# Patient Record
Sex: FEMALE | Race: BLACK OR AFRICAN AMERICAN | NOT HISPANIC OR LATINO | URBAN - METROPOLITAN AREA
[De-identification: names, ages, dates, MRNs, and addresses within clinical notes are randomized per-mention and may not be internally consistent; named-entity substitution may affect disease eponyms.]

---

## 2017-01-16 ENCOUNTER — APPOINTMENT (RX ONLY)
Dept: URBAN - METROPOLITAN AREA OTHER 10 | Facility: OTHER | Age: 52
Setting detail: DERMATOLOGY
End: 2017-01-16

## 2017-01-16 ENCOUNTER — RX ONLY (OUTPATIENT)
Age: 52
Setting detail: RX ONLY
End: 2017-01-16

## 2017-01-16 DIAGNOSIS — Z41.9 ENCOUNTER FOR PROCEDURE FOR PURPOSES OTHER THAN REMEDYING HEALTH STATE, UNSPECIFIED: ICD-10-CM

## 2017-01-16 PROCEDURE — ? CHEMICAL PEEL

## 2017-01-16 RX ORDER — TRETINOIN 0.025 %
CREAM (GRAM) TOPICAL
Qty: 20 | Refills: 3 | COMMUNITY
Start: 2017-01-16

## 2017-01-16 ASSESSMENT — LOCATION SIMPLE DESCRIPTION DERM: LOCATION SIMPLE: LEFT CHEEK

## 2017-01-16 ASSESSMENT — LOCATION DETAILED DESCRIPTION DERM: LOCATION DETAILED: LEFT INFERIOR MEDIAL MALAR CHEEK

## 2017-01-16 ASSESSMENT — LOCATION ZONE DERM: LOCATION ZONE: FACE

## 2017-01-16 NOTE — PROCEDURE: CHEMICAL PEEL
Consent: Prior to the procedure, written consent was obtained and risks were reviewed, including but not limited to: redness, peeling, blistering, pigmentary change, scarring, infection, and pain.
Treatment Time (Optional): 15 minutes
Prep: The treated area was degreased with pre-peel cleanser, and vaseline was applied for protection of mucous membranes.
Post-Care Instructions: I reviewed with the patient in detail post-care instructions. Patient should avoid sun exposure and wear sun protection.
Treatment Number: 1
Detail Level: Zone
Chemical Peel: Jodie
Post Peel Care: After the procedure, a post-peel Retinol activator was applied to the treated areas. Sun protection and post-care instructions were reviewed with the patient.

## 2017-02-27 ENCOUNTER — APPOINTMENT (RX ONLY)
Dept: URBAN - METROPOLITAN AREA OTHER 10 | Facility: OTHER | Age: 52
Setting detail: DERMATOLOGY
End: 2017-02-27

## 2017-02-27 DIAGNOSIS — Z41.9 ENCOUNTER FOR PROCEDURE FOR PURPOSES OTHER THAN REMEDYING HEALTH STATE, UNSPECIFIED: ICD-10-CM | Status: UNCHANGED

## 2017-02-27 PROCEDURE — ? TREATMENT REGIMEN

## 2017-02-27 PROCEDURE — ? PRESCRIPTION

## 2017-02-27 RX ORDER — TRETINOIN 0.25 MG/G
CREAM TOPICAL QHS
Qty: 20 | Refills: 3 | COMMUNITY
Start: 2017-02-27

## 2017-02-27 RX ORDER — PHARMACY COMPOUNDING ACCESSORY
EACH MISCELLANEOUS
Qty: 1 | Refills: 3 | Status: ERX | COMMUNITY
Start: 2017-02-27

## 2017-02-27 RX ADMIN — TRETINOIN: 0.25 CREAM TOPICAL at 14:10

## 2017-02-27 RX ADMIN — Medication: at 14:10

## 2017-02-27 ASSESSMENT — LOCATION SIMPLE DESCRIPTION DERM
LOCATION SIMPLE: RIGHT CHEEK
LOCATION SIMPLE: LEFT CHEEK

## 2017-02-27 ASSESSMENT — LOCATION DETAILED DESCRIPTION DERM
LOCATION DETAILED: LEFT MEDIAL MALAR CHEEK
LOCATION DETAILED: RIGHT MEDIAL MALAR CHEEK

## 2017-02-27 ASSESSMENT — LOCATION ZONE DERM: LOCATION ZONE: FACE

## 2017-02-27 NOTE — PROCEDURE: TREATMENT REGIMEN
Modify Regimen: Hold Lytera and Retin-a x 6 weeks
Initiate Treatment: Bleachease compound every other day to dark spots only
Detail Level: Zone